# Patient Record
Sex: FEMALE | Race: OTHER | NOT HISPANIC OR LATINO
[De-identification: names, ages, dates, MRNs, and addresses within clinical notes are randomized per-mention and may not be internally consistent; named-entity substitution may affect disease eponyms.]

---

## 2020-11-21 ENCOUNTER — TRANSCRIPTION ENCOUNTER (OUTPATIENT)
Age: 30
End: 2020-11-21

## 2020-11-22 ENCOUNTER — EMERGENCY (EMERGENCY)
Facility: HOSPITAL | Age: 30
LOS: 1 days | Discharge: HOME | End: 2020-11-22
Attending: EMERGENCY MEDICINE
Payer: COMMERCIAL

## 2020-11-22 VITALS
TEMPERATURE: 99 F | SYSTOLIC BLOOD PRESSURE: 140 MMHG | WEIGHT: 214.95 LBS | DIASTOLIC BLOOD PRESSURE: 90 MMHG | RESPIRATION RATE: 18 BRPM | HEART RATE: 102 BPM | HEIGHT: 62 IN | OXYGEN SATURATION: 100 %

## 2020-11-22 LAB
ALBUMIN SERPL ELPH-MCNC: 4.3 G/DL — SIGNIFICANT CHANGE UP (ref 3.3–5.2)
ALP SERPL-CCNC: 61 U/L — SIGNIFICANT CHANGE UP (ref 40–120)
ALT FLD-CCNC: 30 U/L — SIGNIFICANT CHANGE UP
ANION GAP SERPL CALC-SCNC: 13 MMOL/L — SIGNIFICANT CHANGE UP (ref 5–17)
AST SERPL-CCNC: 22 U/L — SIGNIFICANT CHANGE UP
BASOPHILS # BLD AUTO: 0.05 K/UL — SIGNIFICANT CHANGE UP (ref 0–0.2)
BASOPHILS NFR BLD AUTO: 0.4 % — SIGNIFICANT CHANGE UP (ref 0–2)
BILIRUB SERPL-MCNC: 0.3 MG/DL — LOW (ref 0.4–2)
BUN SERPL-MCNC: 10 MG/DL — SIGNIFICANT CHANGE UP (ref 8–20)
CALCIUM SERPL-MCNC: 8.8 MG/DL — SIGNIFICANT CHANGE UP (ref 8.6–10.2)
CHLORIDE SERPL-SCNC: 99 MMOL/L — SIGNIFICANT CHANGE UP (ref 98–107)
CO2 SERPL-SCNC: 23 MMOL/L — SIGNIFICANT CHANGE UP (ref 22–29)
CREAT SERPL-MCNC: 0.64 MG/DL — SIGNIFICANT CHANGE UP (ref 0.5–1.3)
EOSINOPHIL # BLD AUTO: 0.24 K/UL — SIGNIFICANT CHANGE UP (ref 0–0.5)
EOSINOPHIL NFR BLD AUTO: 1.7 % — SIGNIFICANT CHANGE UP (ref 0–6)
GLUCOSE SERPL-MCNC: 144 MG/DL — HIGH (ref 70–99)
HCT VFR BLD CALC: 40.4 % — SIGNIFICANT CHANGE UP (ref 34.5–45)
HGB BLD-MCNC: 13.4 G/DL — SIGNIFICANT CHANGE UP (ref 11.5–15.5)
IMM GRANULOCYTES NFR BLD AUTO: 0.4 % — SIGNIFICANT CHANGE UP (ref 0–1.5)
LACTATE BLDV-MCNC: 1.1 MMOL/L — SIGNIFICANT CHANGE UP (ref 0.5–2)
LYMPHOCYTES # BLD AUTO: 1.61 K/UL — SIGNIFICANT CHANGE UP (ref 1–3.3)
LYMPHOCYTES # BLD AUTO: 11.4 % — LOW (ref 13–44)
MCHC RBC-ENTMCNC: 29.5 PG — SIGNIFICANT CHANGE UP (ref 27–34)
MCHC RBC-ENTMCNC: 33.2 GM/DL — SIGNIFICANT CHANGE UP (ref 32–36)
MCV RBC AUTO: 88.8 FL — SIGNIFICANT CHANGE UP (ref 80–100)
MONOCYTES # BLD AUTO: 0.88 K/UL — SIGNIFICANT CHANGE UP (ref 0–0.9)
MONOCYTES NFR BLD AUTO: 6.2 % — SIGNIFICANT CHANGE UP (ref 2–14)
NEUTROPHILS # BLD AUTO: 11.26 K/UL — HIGH (ref 1.8–7.4)
NEUTROPHILS NFR BLD AUTO: 79.9 % — HIGH (ref 43–77)
PLATELET # BLD AUTO: 270 K/UL — SIGNIFICANT CHANGE UP (ref 150–400)
POTASSIUM SERPL-MCNC: 4.5 MMOL/L — SIGNIFICANT CHANGE UP (ref 3.5–5.3)
POTASSIUM SERPL-SCNC: 4.5 MMOL/L — SIGNIFICANT CHANGE UP (ref 3.5–5.3)
PROT SERPL-MCNC: 7.9 G/DL — SIGNIFICANT CHANGE UP (ref 6.6–8.7)
RBC # BLD: 4.55 M/UL — SIGNIFICANT CHANGE UP (ref 3.8–5.2)
RBC # FLD: 11.9 % — SIGNIFICANT CHANGE UP (ref 10.3–14.5)
SODIUM SERPL-SCNC: 135 MMOL/L — SIGNIFICANT CHANGE UP (ref 135–145)
WBC # BLD: 14.1 K/UL — HIGH (ref 3.8–10.5)
WBC # FLD AUTO: 14.1 K/UL — HIGH (ref 3.8–10.5)

## 2020-11-22 PROCEDURE — 99220: CPT

## 2020-11-22 PROCEDURE — 73130 X-RAY EXAM OF HAND: CPT | Mod: 26,RT

## 2020-11-22 RX ORDER — KETOROLAC TROMETHAMINE 30 MG/ML
30 SYRINGE (ML) INJECTION ONCE
Refills: 0 | Status: DISCONTINUED | OUTPATIENT
Start: 2020-11-22 | End: 2020-11-23

## 2020-11-22 RX ORDER — MORPHINE SULFATE 50 MG/1
6 CAPSULE, EXTENDED RELEASE ORAL ONCE
Refills: 0 | Status: DISCONTINUED | OUTPATIENT
Start: 2020-11-22 | End: 2020-11-22

## 2020-11-22 RX ORDER — PIPERACILLIN AND TAZOBACTAM 4; .5 G/20ML; G/20ML
3.38 INJECTION, POWDER, LYOPHILIZED, FOR SOLUTION INTRAVENOUS ONCE
Refills: 0 | Status: COMPLETED | OUTPATIENT
Start: 2020-11-22 | End: 2020-11-22

## 2020-11-22 RX ORDER — VANCOMYCIN HCL 1 G
1000 VIAL (EA) INTRAVENOUS ONCE
Refills: 0 | Status: COMPLETED | OUTPATIENT
Start: 2020-11-22 | End: 2020-11-22

## 2020-11-22 RX ADMIN — MORPHINE SULFATE 6 MILLIGRAM(S): 50 CAPSULE, EXTENDED RELEASE ORAL at 23:37

## 2020-11-22 RX ADMIN — PIPERACILLIN AND TAZOBACTAM 200 GRAM(S): 4; .5 INJECTION, POWDER, LYOPHILIZED, FOR SOLUTION INTRAVENOUS at 23:24

## 2020-11-22 RX ADMIN — Medication 250 MILLIGRAM(S): at 23:43

## 2020-11-22 NOTE — ED CDU PROVIDER INITIAL DAY NOTE - DETAILS
Pt with wound requiring IV and Bax and revauation by Hand in the AM. Pt with wound requiring IV and Abx and revaluation by Hand in the AM.

## 2020-11-22 NOTE — ED STATDOCS - ATTENDING CONTRIBUTION TO CARE
I, Christopher Cain, performed the initial face to face bedside interview with this patient regarding history of present illness, review of symptoms and relevant past medical, social and family history.  I completed an independent physical examination.  I was the initial provider who evaluated this patient. I have signed out the follow up of any pending tests (i.e. labs, radiological studies) to the ACP.  I have communicated the patient’s plan of care and disposition with the ACP.  The history, relevant review of systems, past medical and surgical history, medical decision making, and physical examination was documented by the scribe in my presence and I attest to the accuracy of the documentation.

## 2020-11-22 NOTE — ED CDU PROVIDER INITIAL DAY NOTE - NS ED ROS FT

## 2020-11-22 NOTE — ED STATDOCS - OBJECTIVE STATEMENT
30y female pt with pmhx of eczema presents to ED c/o of hand pain and swelling that began tuesday. Pt went to dermatologist yesterday where she was told she had an infection. Pt states the dermatologist prescribed her Bacterin, Mupirocin which did not resolve the issue. pt is right handed.     pt denies covid risk, fever,

## 2020-11-22 NOTE — ED CDU PROVIDER INITIAL DAY NOTE - SKIN LOCATION #1
Rt palm,ar aspect Sensation is grossly intact to light touch. + 1cm x 1cm palpable collection located on the palm at the base of the 3rd digit/mcp with 3cm surrounding cellulitis extending to the proximal palm and dorsal hand between the 2nd & 3rd mcp.

## 2020-11-22 NOTE — ED CDU PROVIDER INITIAL DAY NOTE - ATTENDING CONTRIBUTION TO CARE
I, Christopher Cain, performed the initial face to face bedside interview with this patient regarding history of present illness, review of symptoms and relevant past medical, social and family history.  I completed an independent physical examination.  I was the initial provider who evaluated this patient. I have signed out the follow up of any pending tests (i.e. labs, radiological studies) to the ACP.  I have communicated the patient’s plan of care and disposition with the ACP.

## 2020-11-22 NOTE — ED CDU PROVIDER INITIAL DAY NOTE - MEDICAL DECISION MAKING DETAILS
PT with abscess placed in obs for Therapeutic intensity. PT seen BY OBS PA found to be appropriate CDU PT care transferred to PA.

## 2020-11-22 NOTE — ED CDU PROVIDER INITIAL DAY NOTE - OBJECTIVE STATEMENT
30y female pt with pmhx of eczema presents to ED c/o of hand pain and swelling that began tuesday. Pt went to dermatologist yesterday where she was told she had an infection. Pt states the dermatologist prescribed her Bacterin, that she took 4 doses of, Mupirocin which did not resolve the issue. pt is right handed. Pt states that pain is moderate in nature constat feels like pressure that is made worse with activity, improved by nothing, non radiating. Pt dines fever, chills, weakness, numbness, tingling, loss of sensation, HA, dizziness, SOB, diff breathing.

## 2020-11-22 NOTE — ED STATDOCS - CLINICAL SUMMARY MEDICAL DECISION MAKING FREE TEXT BOX
30y female, right hand dominant presents with right hand erythema, and restrictive fluctuance, consider cellulitis x-ray, lab, surgery consult.

## 2020-11-22 NOTE — ED ADULT NURSE NOTE - OBJECTIVE STATEMENT
pt presents c/o rt hand pain. as per pt, hx of eczema. seen by dermatologist after cut on hand began to swell. prescribed abx and told to come to ER if swelling worsens.

## 2020-11-22 NOTE — ED STATDOCS - SKIN, MLM
(+)edema, erythema, restrictive fluctuation, extreme tenderness, skin normal color for race, warm, dry and intact.

## 2020-11-23 PROBLEM — Z00.00 ENCOUNTER FOR PREVENTIVE HEALTH EXAMINATION: Status: ACTIVE | Noted: 2020-11-23

## 2020-11-23 LAB — HCG SERPL-ACNC: <4 MIU/ML — SIGNIFICANT CHANGE UP

## 2020-11-23 PROCEDURE — 99226: CPT

## 2020-11-23 RX ORDER — SODIUM CHLORIDE 9 MG/ML
1000 INJECTION INTRAMUSCULAR; INTRAVENOUS; SUBCUTANEOUS ONCE
Refills: 0 | Status: COMPLETED | OUTPATIENT
Start: 2020-11-23 | End: 2020-11-23

## 2020-11-23 RX ORDER — AMPICILLIN SODIUM AND SULBACTAM SODIUM 250; 125 MG/ML; MG/ML
3 INJECTION, POWDER, FOR SUSPENSION INTRAMUSCULAR; INTRAVENOUS EVERY 6 HOURS
Refills: 0 | Status: DISCONTINUED | OUTPATIENT
Start: 2020-11-23 | End: 2020-11-27

## 2020-11-23 RX ORDER — IBUPROFEN 200 MG
600 TABLET ORAL EVERY 8 HOURS
Refills: 0 | Status: DISCONTINUED | OUTPATIENT
Start: 2020-11-23 | End: 2020-11-27

## 2020-11-23 RX ORDER — PIPERACILLIN AND TAZOBACTAM 4; .5 G/20ML; G/20ML
3.38 INJECTION, POWDER, LYOPHILIZED, FOR SOLUTION INTRAVENOUS EVERY 8 HOURS
Refills: 0 | Status: DISCONTINUED | OUTPATIENT
Start: 2020-11-23 | End: 2020-11-23

## 2020-11-23 RX ORDER — VANCOMYCIN HCL 1 G
1500 VIAL (EA) INTRAVENOUS EVERY 12 HOURS
Refills: 0 | Status: DISCONTINUED | OUTPATIENT
Start: 2020-11-23 | End: 2020-11-23

## 2020-11-23 RX ORDER — ACETAMINOPHEN 500 MG
650 TABLET ORAL EVERY 6 HOURS
Refills: 0 | Status: DISCONTINUED | OUTPATIENT
Start: 2020-11-23 | End: 2020-11-27

## 2020-11-23 RX ORDER — LACTOBACILLUS ACIDOPHILUS 100MM CELL
1 CAPSULE ORAL DAILY
Refills: 0 | Status: DISCONTINUED | OUTPATIENT
Start: 2020-11-23 | End: 2020-11-27

## 2020-11-23 RX ADMIN — Medication 30 MILLIGRAM(S): at 06:16

## 2020-11-23 RX ADMIN — SODIUM CHLORIDE 1000 MILLILITER(S): 9 INJECTION INTRAMUSCULAR; INTRAVENOUS; SUBCUTANEOUS at 12:37

## 2020-11-23 RX ADMIN — MORPHINE SULFATE 6 MILLIGRAM(S): 50 CAPSULE, EXTENDED RELEASE ORAL at 07:30

## 2020-11-23 RX ADMIN — Medication 300 MILLIGRAM(S): at 12:49

## 2020-11-23 RX ADMIN — SODIUM CHLORIDE 1000 MILLILITER(S): 9 INJECTION INTRAMUSCULAR; INTRAVENOUS; SUBCUTANEOUS at 15:39

## 2020-11-23 RX ADMIN — Medication 100 MILLIGRAM(S): at 21:47

## 2020-11-23 RX ADMIN — PIPERACILLIN AND TAZOBACTAM 25 GRAM(S): 4; .5 INJECTION, POWDER, LYOPHILIZED, FOR SOLUTION INTRAVENOUS at 05:24

## 2020-11-23 RX ADMIN — Medication 30 MILLIGRAM(S): at 07:30

## 2020-11-23 RX ADMIN — PIPERACILLIN AND TAZOBACTAM 25 GRAM(S): 4; .5 INJECTION, POWDER, LYOPHILIZED, FOR SOLUTION INTRAVENOUS at 15:40

## 2020-11-23 RX ADMIN — Medication 1500 MILLIGRAM(S): at 15:39

## 2020-11-23 RX ADMIN — PIPERACILLIN AND TAZOBACTAM 3.38 GRAM(S): 4; .5 INJECTION, POWDER, LYOPHILIZED, FOR SOLUTION INTRAVENOUS at 09:24

## 2020-11-23 NOTE — ED CDU PROVIDER SUBSEQUENT DAY NOTE - PROGRESS NOTE DETAILS
spoke with Ortho, will give unasyn/clinda, better transition to outpt PO tomorrow -Slowey DO QUINTIN Lawler: Patient reporting improvement. No complaints. Pending morning re-evaluation by ortho. QUINTIN Lawler: Patient reporting improvement. Now with diarrhea. Pending morning re-evaluation by ortho.

## 2020-11-23 NOTE — ED CDU PROVIDER SUBSEQUENT DAY NOTE - HISTORY
PT placed in OBS, has had no acute incidents or complaints while in CDU PT is stable. PT Placed in OBS for cellulites and repeat ABX, ortho eval.

## 2020-11-23 NOTE — CONSULT NOTE ADULT - SUBJECTIVE AND OBJECTIVE BOX
ORTHO-HAND SERVICE    Pt Name: HARLEEN SANCHEZ    MRN: [id number]      Patient is a 30y Female presenting to the emergency department with a chief complaint of right hand erythema/swelling/pain x 4 days. Pt states that she normally gets eczema on her hands and on tuesday night she noticed a break in the skin at the base of the 3rd mcp palmar aspect. Pt states that 2 days later she developed pain/redness. She was seen by her dermatologist x 2 days ago and placed on Bactrim. Pt states that pain/redness got worse and she was told to come to the ED. Pt otherwise denies fever/chills, c/p, sob, abdominal pain, n/v, numbness/tingling, drainage from area and has no other complaints at this time.    Patient presents for evaluation of Right mcp (palm) abscess with surrounding cellulitis.      REVIEW OF SYSTEMS    General: Alert, responsive, in NAD    Skin/Breast: + abscess, + cellulitis  	  Ophthalmologic: No visual changes. No redness.   	  ENMT:	No discharge. No swelling.    Respiratory and Thorax: No difficulty breathing. No cough.  	   Cardiovascular: No chest pain. No palpitations.    Gastrointestinal: No abdominal pain. No diarrhea.     Genitourinary: No dysuria. No bleeding.    Musculoskeletal: SEE HPI.    Neurological: No sensory or motor changes.     Psychiatric: No anxiety or depression.    Hematology/Lymphatics: No swelling.    Endocrine: No Hx of diabetes.    ROS is otherwise negative.    PAST MEDICAL & SURGICAL HISTORY:  PAST MEDICAL & SURGICAL HISTORY:      Allergies: No Known Allergies      Medications: ketorolac   Injectable 30 milliGRAM(s) IV Push once  piperacillin/tazobactam IVPB.. 3.375 Gram(s) IV Intermittent every 8 hours  vancomycin  IVPB 1500 milliGRAM(s) IV Intermittent every 12 hours      FAMILY HISTORY:  : non-contributory    Social History: Denies ETOH abuse.      Daily Height in cm: 157.48 (22 Nov 2020 22:02)    Daily                             13.4   14.10 )-----------( 270      ( 22 Nov 2020 23:23 )             40.4       11-22    135  |  99  |  10.0  ----------------------------<  144<H>  4.5   |  23.0  |  0.64    Ca    8.8      22 Nov 2020 23:23    TPro  7.9  /  Alb  4.3  /  TBili  0.3<L>  /  DBili  x   /  AST  22  /  ALT  30  /  AlkPhos  61  11-22        PHYSICAL EXAM:      Appearance: Alert, responsive, in no acute distress.    RUE: Sensation is grossly intact to light touch. + 1cm x 1cm palpable collection located on the palm at the base of the 3rd digit/mcp with 3cm surrounding cellulitis extending to the proximal palm and dorsal hand between the 2nd & 3rd mcp. No active drainage noted. 2+ distal radial pulse. Cap refill < 2 sec. No signs of venous  insufficiency  or stasis. No extremity ulcerations. No cyanosis. +flexion/extension of all digits with slight limited ROM. Mild swelling at the base of the 2nd/3th digits. Mild pain at full passive extension. TTP over the base of the 3rd mcp (palmar aspect) where collection is palpated. Compartments soft and compressible.    Imaging Studies: All imaging reviewed with Dr. Armendariz.    Procedure: INCISION AND DRAINAGE  PROCEDURE NOTE: I & D    Performed by:  Teodoro Chavez PA-C    Indication: Right hand abscess    Consent: The risks and benefits of the procedure including incomplete reduction, nerve damage and bleeding were explained and the patient verbalized their understanding and wished to proceed with the procedure. Verbal consent was obtained following the discussion.    Universal Protocol: a time out was performed and the correct patient and site were verified     Procedure: Neurovascular exam intact prior to procedure. 3cc 1& lidocaine was used at the area of abscess. Approx 1cm superficial vertical incision was made on the palm at the base of the 3rd mcp, needle  was used to spread open. Significant amount of purulent drainage was drained from the region. Culture obtained and sent to the lab. 1/4" Packing placed. Area was dressed using gauze/cling, placed in a splint and elevation pillow.   Distally, the extremity was neurovascular intact following the procedure.  The patient tolerated the procedure well.     Complications: None     A/P:  Pt is a  30y Female with right 3rd mcp (palmar aspect) abscess with surrounding cellulitis.    PLAN d/w Dr. Armendariz:   * Bedside I&D performed   * Packing placed  * Splint applied with elevation pillow  * Pt placed in ED OBS for IV abx  * Dr. Armendariz to examine patient tomorrow  * Ortho to follow to monitor for improvement

## 2020-11-24 PROCEDURE — 99226: CPT

## 2020-11-24 PROCEDURE — 73201 CT UPPER EXTREMITY W/DYE: CPT | Mod: 26,RT

## 2020-11-24 RX ADMIN — Medication 650 MILLIGRAM(S): at 17:30

## 2020-11-24 RX ADMIN — Medication 650 MILLIGRAM(S): at 05:46

## 2020-11-24 RX ADMIN — Medication 650 MILLIGRAM(S): at 00:13

## 2020-11-24 RX ADMIN — Medication 600 MILLIGRAM(S): at 22:15

## 2020-11-24 RX ADMIN — AMPICILLIN SODIUM AND SULBACTAM SODIUM 3 GRAM(S): 250; 125 INJECTION, POWDER, FOR SUSPENSION INTRAMUSCULAR; INTRAVENOUS at 22:15

## 2020-11-24 RX ADMIN — AMPICILLIN SODIUM AND SULBACTAM SODIUM 200 GRAM(S): 250; 125 INJECTION, POWDER, FOR SUSPENSION INTRAMUSCULAR; INTRAVENOUS at 00:13

## 2020-11-24 RX ADMIN — AMPICILLIN SODIUM AND SULBACTAM SODIUM 200 GRAM(S): 250; 125 INJECTION, POWDER, FOR SUSPENSION INTRAMUSCULAR; INTRAVENOUS at 12:18

## 2020-11-24 RX ADMIN — Medication 100 MILLIGRAM(S): at 21:32

## 2020-11-24 RX ADMIN — Medication 100 MILLIGRAM(S): at 14:31

## 2020-11-24 RX ADMIN — AMPICILLIN SODIUM AND SULBACTAM SODIUM 3 GRAM(S): 250; 125 INJECTION, POWDER, FOR SUSPENSION INTRAMUSCULAR; INTRAVENOUS at 06:45

## 2020-11-24 RX ADMIN — Medication 650 MILLIGRAM(S): at 16:39

## 2020-11-24 RX ADMIN — Medication 1 TABLET(S): at 12:18

## 2020-11-24 RX ADMIN — Medication 650 MILLIGRAM(S): at 22:12

## 2020-11-24 RX ADMIN — AMPICILLIN SODIUM AND SULBACTAM SODIUM 200 GRAM(S): 250; 125 INJECTION, POWDER, FOR SUSPENSION INTRAMUSCULAR; INTRAVENOUS at 05:45

## 2020-11-24 RX ADMIN — AMPICILLIN SODIUM AND SULBACTAM SODIUM 200 GRAM(S): 250; 125 INJECTION, POWDER, FOR SUSPENSION INTRAMUSCULAR; INTRAVENOUS at 19:10

## 2020-11-24 RX ADMIN — AMPICILLIN SODIUM AND SULBACTAM SODIUM 3 GRAM(S): 250; 125 INJECTION, POWDER, FOR SUSPENSION INTRAMUSCULAR; INTRAVENOUS at 12:48

## 2020-11-24 RX ADMIN — Medication 100 MILLIGRAM(S): at 05:45

## 2020-11-24 RX ADMIN — Medication 600 MILLIGRAM(S): at 06:45

## 2020-11-24 NOTE — ED ADULT NURSE REASSESSMENT NOTE - COMFORT CARE
meal provided
meal provided/treatment delay explained/wait time explained/plan of care explained/po fluids offered
plan of care explained
wait time explained/meal provided/plan of care explained

## 2020-11-24 NOTE — ED ADULT NURSE REASSESSMENT NOTE - NS ED NURSE REASSESS COMMENT FT1
hand surgeon at bedside to discuss plan of care with pt.
pt is stable , no distress, sating well, pain is stable at this present, awaiting of CTA , abx given , continue to monitor
pt. a&ox3. pt. denies pain or discomfort at this time, breathing even and unlabored.
pt. awake, alert. pt. denies pain or discomfort. informed on plan of care.
pt. received from night RN. ptIsaias a&ox3. pt. states she has a hx. of eczema and had a cut on her finger that got affected. pt. denies pain or discomfort at this time. pt. in no apparent distress, breathing even and unlabored.
Pt awake and alert, no respiratory distress, VSS afebrile. Pt c/o headache. IV abx infusing as ordered. Pt tolerated dinner well. Right hand in ace bandage, elevation encouraged. Ambulating without difficulty. Safety maintained. Call bell within reach.
Assumed care of the patient @0030. Pt A&Ox4. Pt denies any c/o pain or discomfort at this time. Right hand in cast, finger tips warm.  Patient in understanding of plan of care. Patient with no further questions for the RN. Resting in comfort. Call bell within reach and encouraged to use when assistance needed. Will continue to monitor.
Pt A&Ox4. Patient in understanding of plan of care. Pt tolerated IV abx well.  Patient with no further questions for the RN. Resting in comfort. Call bell within reach and encouraged to use when assistance needed. Will continue to monitor.

## 2020-11-25 VITALS
HEART RATE: 82 BPM | OXYGEN SATURATION: 97 % | RESPIRATION RATE: 18 BRPM | DIASTOLIC BLOOD PRESSURE: 69 MMHG | SYSTOLIC BLOOD PRESSURE: 105 MMHG | TEMPERATURE: 98 F

## 2020-11-25 LAB
-  AMPICILLIN/SULBACTAM: SIGNIFICANT CHANGE UP
-  CEFAZOLIN: SIGNIFICANT CHANGE UP
-  CLINDAMYCIN: SIGNIFICANT CHANGE UP
-  DAPTOMYCIN: SIGNIFICANT CHANGE UP
-  ERYTHROMYCIN: SIGNIFICANT CHANGE UP
-  GENTAMICIN: SIGNIFICANT CHANGE UP
-  LINEZOLID: SIGNIFICANT CHANGE UP
-  OXACILLIN: SIGNIFICANT CHANGE UP
-  PENICILLIN: SIGNIFICANT CHANGE UP
-  RIFAMPIN: SIGNIFICANT CHANGE UP
-  TETRACYCLINE: SIGNIFICANT CHANGE UP
-  TRIMETHOPRIM/SULFAMETHOXAZOLE: SIGNIFICANT CHANGE UP
-  VANCOMYCIN: SIGNIFICANT CHANGE UP
METHOD TYPE: SIGNIFICANT CHANGE UP

## 2020-11-25 PROCEDURE — 96366 THER/PROPH/DIAG IV INF ADDON: CPT

## 2020-11-25 PROCEDURE — 36415 COLL VENOUS BLD VENIPUNCTURE: CPT

## 2020-11-25 PROCEDURE — 85025 COMPLETE CBC W/AUTO DIFF WBC: CPT

## 2020-11-25 PROCEDURE — 87186 SC STD MICRODIL/AGAR DIL: CPT

## 2020-11-25 PROCEDURE — 84702 CHORIONIC GONADOTROPIN TEST: CPT

## 2020-11-25 PROCEDURE — 10061 I&D ABSCESS COMP/MULTIPLE: CPT

## 2020-11-25 PROCEDURE — 73201 CT UPPER EXTREMITY W/DYE: CPT

## 2020-11-25 PROCEDURE — 73130 X-RAY EXAM OF HAND: CPT

## 2020-11-25 PROCEDURE — G0378: CPT

## 2020-11-25 PROCEDURE — 96375 TX/PRO/DX INJ NEW DRUG ADDON: CPT | Mod: XU

## 2020-11-25 PROCEDURE — 96365 THER/PROPH/DIAG IV INF INIT: CPT | Mod: XU

## 2020-11-25 PROCEDURE — 96367 TX/PROPH/DG ADDL SEQ IV INF: CPT | Mod: XU

## 2020-11-25 PROCEDURE — 99217: CPT

## 2020-11-25 PROCEDURE — 99284 EMERGENCY DEPT VISIT MOD MDM: CPT | Mod: 25

## 2020-11-25 PROCEDURE — 87205 SMEAR GRAM STAIN: CPT

## 2020-11-25 PROCEDURE — 96368 THER/DIAG CONCURRENT INF: CPT

## 2020-11-25 PROCEDURE — 80053 COMPREHEN METABOLIC PANEL: CPT

## 2020-11-25 PROCEDURE — 83605 ASSAY OF LACTIC ACID: CPT

## 2020-11-25 PROCEDURE — 87040 BLOOD CULTURE FOR BACTERIA: CPT

## 2020-11-25 PROCEDURE — 96376 TX/PRO/DX INJ SAME DRUG ADON: CPT | Mod: XU

## 2020-11-25 PROCEDURE — 87070 CULTURE OTHR SPECIMN AEROBIC: CPT

## 2020-11-25 RX ORDER — IBUPROFEN 200 MG
1 TABLET ORAL
Qty: 20 | Refills: 0
Start: 2020-11-25 | End: 2020-11-29

## 2020-11-25 RX ORDER — AMOXICILLIN 250 MG/5ML
1 SUSPENSION, RECONSTITUTED, ORAL (ML) ORAL
Qty: 14 | Refills: 0
Start: 2020-11-25 | End: 2020-12-01

## 2020-11-25 RX ADMIN — AMPICILLIN SODIUM AND SULBACTAM SODIUM 200 GRAM(S): 250; 125 INJECTION, POWDER, FOR SUSPENSION INTRAMUSCULAR; INTRAVENOUS at 00:36

## 2020-11-25 RX ADMIN — AMPICILLIN SODIUM AND SULBACTAM SODIUM 200 GRAM(S): 250; 125 INJECTION, POWDER, FOR SUSPENSION INTRAMUSCULAR; INTRAVENOUS at 05:39

## 2020-11-25 RX ADMIN — Medication 100 MILLIGRAM(S): at 05:39

## 2020-11-25 NOTE — ED CDU PROVIDER SUBSEQUENT DAY NOTE - NO PERTINENT FAMILY HISTORY IN FIRST DEGREE RELATIVES OF:
PT with no sig family hx related to current condition.

## 2020-11-25 NOTE — ED CDU PROVIDER DISPOSITION NOTE - CARE PROVIDER_API CALL
Lazaro Magaña (DO)  Orthopaedic Surgery  [address] [place] [address]  Phone: [phone]  Fax: [phone]  Follow Up Time:

## 2020-11-25 NOTE — ED CDU PROVIDER SUBSEQUENT DAY NOTE - ATTENDING CONTRIBUTION TO CARE
30yoF p/w right UE cellulitis s/p i+d  HAND: able to flex fingers, no erythema  A/P:  30yoF p/w hand cellulitis  -pending hand surg re-eval
Juan MCKEON- 31 Y/O F placed in cdu for hand infection of rt hand post eczema and seen by ortho and drained and currently placed in splint and upward direction. NO fever, chills and patient feels better    pt is alert, well appearing female, s1s2 normal reg, rt hand in bandage and elevation splint, good sensation and color at tip, neuro exam aox3, cn 2-12 intact , no focal deficits, skin warm, dry, good turgor    plan to f/u with ortho
Pt seen and evaluated with ACP.  Agree with above

## 2020-11-25 NOTE — ED CDU PROVIDER SUBSEQUENT DAY NOTE - GASTROINTESTINAL, MLM
Abdomen soft, non-tender, no rebound, no guarding.

## 2020-11-25 NOTE — ED CDU PROVIDER DISPOSITION NOTE - PATIENT PORTAL LINK FT
You can access the FollowMyHealth Patient Portal offered by [facility] by registering at the following website: [email or web address]. By joining [facility]’s FollowMyHealth portal, you will also be able to view your health information using other applications (apps) compatible with our system.

## 2020-11-25 NOTE — ED CDU PROVIDER SUBSEQUENT DAY NOTE - PHYSICAL EXAMINATION
Rt palm,ar aspect Sensation is grossly intact to light touch. + 1cm x 1cm area of tenderness that has improved from earlier exams,  located on the palm at the base of the 3rd digit/mcp with 3cm surrounding cellulitis extending to the proximal palm and dorsal hand between the 2nd & 3rd mcp.

## 2020-11-25 NOTE — ED CDU PROVIDER DISPOSITION NOTE - CLINICAL COURSE
Pt with no Sig medical hx, found to have abscesses and cellulitis placed in obs for IV ABX, Pt has had no acute incidents or complaints while in CDU PT is stable. PT Placed in OBS for IV ABx, seen by orrobert in middle of night cleared for Dc home with tiral of PO meds, follow up to the office, Pt to be given one more dose of IV ABx, and DC.

## 2020-11-25 NOTE — PROGRESS NOTE ADULT - SUBJECTIVE AND OBJECTIVE BOX
Pts CT scan shows:    < from: CT Hand w/ IV Cont, Right (11.24.20 @ 21:16) >   EXAM:  CT HAND ONLY IC RT                          PROCEDURE DATE:  11/24/2020          INTERPRETATION:  Indication:   Local infection of the right hand, evaluate for abscess    Technique: Helical CT of the right hand obtained after the intravenous administration of 95 mL Omnipaque 300, 5 mL discarded, and multiplanar reformats submitted. Additional 3-D reformatted images created on an outside workstation and submitted for review. CT dose lowering techniques were used, to include: automated exposure control, adjustment for patient size, and or use of iterative reconstruction.    Comparison: Radiograph from November 22, 2020    Findings:  Marked focal soft tissue thickening involving the palmar aspect at the base of the second and third fingers, with an irregular 10 x 9 x 11 mm area centered in this region extending to the skin. No soft tissue emphysema. No evidence of tenosynovitis. No radiopaque foreign matter.    No focal periostitis or osteopenia. No cortical destruction.    Impression:  1. 11 mm area of phlegmon/developing abscess in the palmar aspect of the second interdigital webspace of the right hand, extends to the with surrounding soft tissue thickening.  2. No radiopaque foreign matter or CT evidence of acute osteomyelitis nor gas-forming infection.                MARLENA STREETER MD; Attending Radiologist  This document has been electronically signed. Nov 24 2020 10:06PM    < end of copied text >      PLAN d/w Dr. Magaña:   * Pts CT scan reviewed by Dr. Magaña-- region mentioned in above CT scan is the area that was already I/D and washed out  * Pt may be discharged in the a.m. on PO Clindamycin  * Pt to follow up in the office with Dr. Magaña this friday, 11/27  * Ortho stable for d/c

## 2020-11-25 NOTE — ED CDU PROVIDER SUBSEQUENT DAY NOTE - MEDICAL DECISION MAKING DETAILS
Continue CDU intake orders await consult recommendations, lab results, and imaging results.
29 yo female PMHx eczema with right hand abscess and cellulitis. Receiving Clindamycin and Unasyn. Improvement in ROM. Pending morning reassessment by orthopedist.
PT placed in OBS, has had no acute incidents or complaints while in CDU PT is stable. PT Placed in OBS for IV ABx, seen by orhto in middle of night cleared for Dc home with tiral of PO meds, follow up to the office, Pt to be given one more dose of IV ABx, and DC.

## 2020-11-25 NOTE — ED CDU PROVIDER DISPOSITION NOTE - ADDITIONAL NOTES AND INSTRUCTIONS:
PT was evaluated At [facility] ED and was found to have a condition that warranted time of to rest and heal from WORK/SCHOOL.   Glen Montenegro PA-C

## 2020-11-25 NOTE — ED CDU PROVIDER SUBSEQUENT DAY NOTE - HISTORY
PT placed in OBS, has had no acute incidents or complaints while in CDU PT is stable. PT Placed in OBS for IV ABx, seen by orhto in middle of night cleared for Dc home with tiral of PO meds, follow up to the office, Pt to be given one more dose of IV ABx, and DC. PT placed in OBS, has had no acute incidents or complaints while in CDU PT is stable. PT Placed in OBS for IV ABx, seen by orhto in middle of night cleared for Dc home with trial of PO meds, follow up to the office, Pt to be given one more dose of IV ABx, and DC.

## 2020-11-25 NOTE — ED CDU PROVIDER DISPOSITION NOTE - NSFOLLOWUPINSTRUCTIONS_ED_ALL_ED_FT
Patient education: Cellulitis and erysipelas (skin infections) (The Basics)  View in Mauritian  Written by the doctors and editors at [facility]  What are cellulitis and erysipelas?  Cellulitis and erysipelas are both infections of the skin. These infections can cause redness, pain, and swelling. The difference between them is that erysipelas tends to affect the upper layers of skin, and cellulitis tends to affect deep layers of skin and sometimes the fat under the skin.    Cellulitis and erysipelas can happen when germs get into the skin. Normally, different types of germs live on a person's skin. Most of the time, these germs do not cause any problems. But if a person gets a cut or a break in the skin, the germs can get into the skin and cause an infection.    Certain conditions can increase a person's chance of getting cellulitis or erysipelas. These include:    ?Having a cut (even a tiny one)    ?Having another type of skin infection or a long-term skin condition    ?Having swelling of the skin or swelling in the body    ?Being overweight    What are the symptoms of cellulitis and erysipelas?  Both types of infection cause very similar symptoms. Either infection can cause the infected area to be painful, red, swollen, or warm. Some people with cellulitis or erysipelas can sometimes also have fever or chills. And sometimes, people with these infections have no symptoms or only some of these symptoms.    Most of the time, cellulitis and erysipelas happen on the legs or arms. But people can get these infections in other places, such as the belly, the face, in the mouth, or around the anus.    Will I need tests?  Most people do not need any tests. Your doctor or nurse will do an exam and look at your skin.    It's important for a doctor or nurse to do an exam to figure out what kind of infection you have. The right treatment for a skin infection depends on the type of infection it is and which germs are causing it. Your doctor or nurse might need to do tests to figure out the cause of your infection.    If you have cellulitis or erysipelas, it's important to get treated. These infections can spread to the whole body and become serious if not treated.    How are cellulitis and erysipelas treated?  These infections are treated with antibiotic pills. If your doctor prescribes medicine for you to take at home, it is important to follow the directions exactly. Take all of the pills you are given, even if you feel better before you finish them. If you do not take all the pills, the infection can come back worse.    People who have severe infections might be treated in the hospital and given antibiotics through a thin tube that goes into the vein, called an "IV".    What can I do to help treat my infection?  You can:    ?Raise your arm or leg (if your infection is on your arm or leg) to reduce swelling – Raise the arm or leg up above the level of your heart 3 or 4 times a day, for 30 minutes each time.    ?Keep the infected area clean and dry – You can take a shower or bath, but be sure to pat the area dry with a towel afterward. Do not put any antibiotic ointments or creams on the area.    Should I call my doctor or nurse?  You should call your doctor or nurse if your symptoms do not get better within 3 days of starting treatment. You should also call if the red area gets:    ?Bigger    ?More swollen    ?More painful    Your doctor or nurse might do another exam or tests to see if you need different medicines.    Can skin infections be prevented?  Sometimes. If you cut your skin, make sure to wash the area well with soap and water. This can help prevent the area from getting infected. If you have a long-term skin condition, ask your doctor or nurse what you can do to help prevent infections.      WHAT YOU NEED TO KNOW:    What is an abscess? An abscess is an area under the skin where pus (infected fluid) collects. An abscess is often caused by bacteria, fungi or other germs that get into an open wound. You can get an abscess anywhere on your body.    What increases my risk for an abscess?   •An animal bite      •A foreign object lodged under your skin      •Heavy or frequent sweating      •A health problem, such as diabetes or obesity      •Injecting illegal drugs      What are the signs and symptoms of an abscess? You may have a swollen mass that is red and painful. Pus may leak out of the mass. The pus will be white or yellow and may smell bad. You may have redness and pain days before the mass appears. You may have a fever and chills if the infection spreads.     How is an abscess diagnosed? Your healthcare provider will examine the area. He will check to see if your abscess is draining. A sample of fluid from your abscess may show what is causing your infection.    How is an abscess treated?   •Incision and drainage is a procedure used to remove pus and fluid from the abscess. Your healthcare provider will make a cut in the abscess so it can drain. Then gauze will be put into the wound and it will be covered with a bandage.      •Surgery may be needed to remove your abscess. Your healthcare provider may do this if the abscess is on your hands or buttocks. Surgery can decrease the risk that the abscess will come back.      What can I do to care for my self?   •Apply a warm compress to your abscess. This will help it open and drain. Wet a washcloth in warm, but not hot, water. Apply the compress for 10 minutes. Repeat this 4 times each day. Do not press on an abscess or try to open it with a needle. You may push the bacteria deeper or into your blood.       •Do not share your clothes, towels, or sheets with anyone. This can spread the infection to others.       •Wash your hands often. This can help prevent the spread of germs. Use soap and water or an alcohol-based hand rub.       What can I do to care for my wound after it is drained?   •Care for your wound as directed. If your healthcare provider says it is okay, carefully remove the bandage and gauze packing. You may need to soak the gauze to get it out of your wound. Clean your wound and the area around it as directed. Dry the area and put on new, clean bandages. Change your bandages when they get wet or dirty.       •Ask your healthcare provider how to change the gauze in your wound. Keep track of how many pieces of gauze are placed inside the wound. Do not put too much packing in the wound. Do not pack the gauze too tightly in your wound.       When should I seek immediate care?   •The area around your abscess becomes very painful, warm, or has red streaks.      •You have a fever and chills.      •Your heart is beating faster than usual.       •You feel faint or confused.      When should I contact my healthcare provider?   •Your abscess gets bigger.      •Your abscess returns.      •You have questions or concerns about your condition or care.      CARE AGREEMENT:    You have the right to help plan your care. Learn about your health condition and how it may be treated. Discuss treatment options with your healthcare providers to decide what care you want to receive. You always have the right to refuse treatment

## 2020-11-28 LAB
CULTURE RESULTS: SIGNIFICANT CHANGE UP
ORGANISM # SPEC MICROSCOPIC CNT: SIGNIFICANT CHANGE UP
ORGANISM # SPEC MICROSCOPIC CNT: SIGNIFICANT CHANGE UP
SPECIMEN SOURCE: SIGNIFICANT CHANGE UP

## 2020-12-10 NOTE — ED CDU PROVIDER SUBSEQUENT DAY NOTE - NO SIGNIFICANT PAST SURGICAL HISTORY
Render Post-Care Instructions In Note?: no
<<----- Click to add NO significant Past Surgical History
Duration Of Freeze Thaw-Cycle (Seconds): 3
Consent: The patient's consent was obtained including but not limited to risks of crusting, scabbing, blistering, scarring, darker or lighter pigmentary change, recurrence, incomplete removal and infection.
Detail Level: Detailed
Post-Care Instructions: I reviewed with the patient in detail post-care instructions. Patient is to wear sunprotection, and avoid picking at any of the treated lesions. Pt may apply Vaseline to crusted or scabbing areas.
Number Of Freeze-Thaw Cycles: 2 freeze-thaw cycles

## 2023-03-23 PROBLEM — Z78.9 OTHER SPECIFIED HEALTH STATUS: Chronic | Status: ACTIVE | Noted: 2020-11-23

## 2023-04-19 ENCOUNTER — APPOINTMENT (OUTPATIENT)
Dept: OTOLARYNGOLOGY | Facility: CLINIC | Age: 33
End: 2023-04-19
Payer: COMMERCIAL

## 2023-04-19 VITALS — BODY MASS INDEX: 40.07 KG/M2 | TEMPERATURE: 97.1 F | WEIGHT: 215 LBS | HEIGHT: 61.5 IN

## 2023-04-19 DIAGNOSIS — H90.3 SENSORINEURAL HEARING LOSS, BILATERAL: ICD-10-CM

## 2023-04-19 DIAGNOSIS — H93.13 TINNITUS, BILATERAL: ICD-10-CM

## 2023-04-19 DIAGNOSIS — H69.81 OTHER SPECIFIED DISORDERS OF EUSTACHIAN TUBE, RIGHT EAR: ICD-10-CM

## 2023-04-19 PROCEDURE — 99203 OFFICE O/P NEW LOW 30 MIN: CPT

## 2023-04-19 PROCEDURE — 92557 COMPREHENSIVE HEARING TEST: CPT

## 2023-04-19 PROCEDURE — 92567 TYMPANOMETRY: CPT

## 2023-04-19 RX ORDER — PREDNISONE 10 MG/1
10 TABLET ORAL
Qty: 18 | Refills: 3 | Status: ACTIVE | COMMUNITY
Start: 2023-04-19 | End: 1900-01-01

## 2023-04-19 RX ORDER — CLOBETASOL PROPIONATE 0.5 MG/G
CREAM TOPICAL
Refills: 0 | Status: ACTIVE | COMMUNITY

## 2023-04-19 NOTE — REVIEW OF SYSTEMS
[Hearing Loss] : hearing loss [Negative] : Heme/Lymph [Patient Intake Form Reviewed] : Patient intake form was reviewed [de-identified] : pressure over eyes and behind ears

## 2023-04-19 NOTE — HISTORY OF PRESENT ILLNESS
[de-identified] : exposed to [employer] music 3 weeks ago\par not ear ringing next day and reduced hearing but now better\par to [facility] and dx ?jessica\par rx antibiotic and ear gtts neomycin\par f/u and rx pred' x 5 d\par neg pmh re ears

## 2023-04-19 NOTE — ASSESSMENT
[FreeTextEntry1] : exam unremarkable\par ad tinnitus resolved\par audio ad cond loss as wnl b tymp ad\par ad jessica #18\par fu 2 weeks

## 2023-05-03 ENCOUNTER — APPOINTMENT (OUTPATIENT)
Dept: OTOLARYNGOLOGY | Facility: CLINIC | Age: 33
End: 2023-05-03
Payer: COMMERCIAL

## 2023-05-03 VITALS — TEMPERATURE: 97.7 F | WEIGHT: 220 LBS | HEIGHT: 62 IN | BODY MASS INDEX: 40.48 KG/M2

## 2023-05-03 DIAGNOSIS — H93.291 OTHER ABNORMAL AUDITORY PERCEPTIONS, RIGHT EAR: ICD-10-CM

## 2023-05-03 DIAGNOSIS — H61.21 IMPACTED CERUMEN, RIGHT EAR: ICD-10-CM

## 2023-05-03 DIAGNOSIS — H65.21 CHRONIC SEROUS OTITIS MEDIA, RIGHT EAR: ICD-10-CM

## 2023-05-03 PROCEDURE — 69210 REMOVE IMPACTED EAR WAX UNI: CPT

## 2023-05-03 PROCEDURE — 99212 OFFICE O/P EST SF 10 MIN: CPT | Mod: 25

## 2023-05-03 NOTE — PROCEDURE
[FreeTextEntry6] : Indication: Cannot adequately examine ear canal/tympanic membrane with otoscope.\par Findings\par squamous debris and neomycin gtts on ad tm\par partailly cleared

## 2023-07-10 ENCOUNTER — OFFICE (OUTPATIENT)
Dept: URBAN - METROPOLITAN AREA CLINIC 116 | Facility: CLINIC | Age: 33
Setting detail: OPHTHALMOLOGY
End: 2023-07-10
Payer: COMMERCIAL

## 2023-07-10 DIAGNOSIS — H52.203: ICD-10-CM

## 2023-07-10 DIAGNOSIS — H52.13: ICD-10-CM

## 2023-07-10 DIAGNOSIS — Z01.00: ICD-10-CM
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
## 2023-07-10 PROCEDURE — 92004 COMPRE OPH EXAM NEW PT 1/>: CPT | Performed by: OPTOMETRIST

## 2023-07-10 PROCEDURE — V2500 CONTACT LENS PMMA SPHERICAL: HCPCS | Performed by: OPTOMETRIST

## 2023-07-10 PROCEDURE — 92310 CONTACT LENS FITTING OU: CPT | Performed by: OPTOMETRIST

## 2023-07-10 PROCEDURE — 55555 MISCELLANEOUS CHARGES: CPT | Performed by: OPTOMETRIST

## 2023-07-10 ASSESSMENT — REFRACTION_AUTOREFRACTION
OD_CYLINDER: -1.75
OD_AXIS: 165
OS_AXIS: 005
OD_SPHERE: +0.25
OS_SPHERE: -0.50
OS_CYLINDER: -0.75

## 2023-07-10 ASSESSMENT — REFRACTION_MANIFEST
OD_VA1: 20/25
OD_SPHERE: -0.50
OD_AXIS: 165
OS_SPHERE: -0.50
OD_CYLINDER: -1.50
OS_VA1: 20/25
OS_CYLINDER: -0.75
OS_AXIS: 005

## 2023-07-10 ASSESSMENT — SPHEQUIV_DERIVED
OD_SPHEQUIV: -1.25
OS_SPHEQUIV: -0.875
OS_SPHEQUIV: -0.875
OD_SPHEQUIV: -0.625

## 2023-07-10 ASSESSMENT — TONOMETRY
OD_IOP_MMHG: 18
OS_IOP_MMHG: 18

## 2023-07-10 ASSESSMENT — REFRACTION_CURRENTRX
OS_SPHERE: -0.75
OD_CYLINDER: -1.00
OS_OVR_VA: 20/
OD_SPHERE: -0.50
OS_AXIS: 015
OD_AXIS: 160
OD_OVR_VA: 20/
OS_CYLINDER: -0.25

## 2023-07-10 ASSESSMENT — KERATOMETRY
OD_AXISANGLE_DEGREES: 080
OS_K1POWER_DIOPTERS: 43.00
OD_K2POWER_DIOPTERS: 45.25
OD_K1POWER_DIOPTERS: 42.50
OS_AXISANGLE_DEGREES: 100
OS_K2POWER_DIOPTERS: 44.50

## 2023-07-10 ASSESSMENT — VISUAL ACUITY
OD_BCVA: 20/30
OS_BCVA: 20/40

## 2023-07-10 ASSESSMENT — CONFRONTATIONAL VISUAL FIELD TEST (CVF)
OD_FINDINGS: FULL
OS_FINDINGS: FULL

## 2023-07-10 ASSESSMENT — AXIALLENGTH_DERIVED
OD_AL: 23.9464
OD_AL: 23.6981
OS_AL: 23.8436
OS_AL: 23.8436

## 2023-07-17 ENCOUNTER — OFFICE (OUTPATIENT)
Dept: URBAN - METROPOLITAN AREA CLINIC 116 | Facility: CLINIC | Age: 33
Setting detail: OPHTHALMOLOGY
End: 2023-07-17
Payer: COMMERCIAL

## 2023-07-17 DIAGNOSIS — H52.13: ICD-10-CM

## 2023-07-17 DIAGNOSIS — H52.203: ICD-10-CM

## 2023-07-17 PROBLEM — Z01.00 ENCOUNTER FOR EXAMINATION OF EYES AND VISION WITHOUT ABNORMAL FINDINGS: Status: ACTIVE | Noted: 2023-07-10

## 2023-07-17 PROCEDURE — N/C NO CHARGE: Performed by: OPTOMETRIST

## 2023-07-17 ASSESSMENT — AXIALLENGTH_DERIVED
OD_AL: 23.9679
OD_AL: 24.429
OS_AL: 24.0212
OS_AL: 24.2248

## 2023-07-17 ASSESSMENT — KERATOMETRY
OS_K2POWER_DIOPTERS: 43.25
OD_K2POWER_DIOPTERS: 43.25
OS_AXISANGLE_DEGREES: 110
OS_K1POWER_DIOPTERS: 42.25
OD_AXISANGLE_DEGREES: 070
OD_K1POWER_DIOPTERS: 42.00

## 2023-07-17 ASSESSMENT — REFRACTION_CURRENTRX
OS_OVR_VA: 20/
OS_AXIS: 015
OD_SPHERE: -0.50
OD_CYLINDER: -1.00
OS_SPHERE: -0.75
OS_CYLINDER: -0.25
OD_OVR_VA: 20/
OD_AXIS: 160

## 2023-07-17 ASSESSMENT — SPHEQUIV_DERIVED
OD_SPHEQUIV: -0.125
OS_SPHEQUIV: -0.875
OS_SPHEQUIV: -0.375
OD_SPHEQUIV: -1.25

## 2023-07-17 ASSESSMENT — REFRACTION_MANIFEST
OS_CYLINDER: -0.75
OS_VA1: 20/25
OD_AXIS: 165
OD_VA1: 20/25
OD_CYLINDER: -1.50
OS_AXIS: 005
OS_SPHERE: -0.50
OD_SPHERE: -0.50

## 2023-07-17 ASSESSMENT — REFRACTION_AUTOREFRACTION
OS_SPHERE: -0.25
OD_AXIS: 135
OS_AXIS: 090
OS_CYLINDER: -0.25
OD_CYLINDER: -0.25
OD_SPHERE: 0.00

## 2023-07-17 ASSESSMENT — CONFRONTATIONAL VISUAL FIELD TEST (CVF)
OS_FINDINGS: FULL
OD_FINDINGS: FULL

## 2023-07-17 ASSESSMENT — VISUAL ACUITY
OD_BCVA: 20/30
OS_BCVA: 20/40

## 2025-04-17 ENCOUNTER — APPOINTMENT (OUTPATIENT)
Dept: INTERNAL MEDICINE | Facility: CLINIC | Age: 35
End: 2025-04-17